# Patient Record
(demographics unavailable — no encounter records)

---

## 2017-01-01 NOTE — CONSULT
- Maternal History


Mother's Age: 38 years


 Status: 


Mother's Blood Type: A+


HBSAG: Negative


Date: 16


RPR: Negative


Date: 16


Group B Strep: Negative


HIV: Negative





- Maternal Risks


OB Risks: POSITIVE PPD (LD told in reprt that CXR negative).  SAB x2.  C/S 





 Data





- Admission


Date of Admission: 17


Admission Time: 22:52


Date of Delivery: 17


Time of Delivery: 22:35


Wks Gestation by Dates: 40.4


Infant Gender: Female


Type of Delivery: Repeat C/S


Reason for C Section: repeat


Apgar Score @1 Minute: 9


Apgar score @ 5 Minutes: 9


Birth Weight: 3.075 kg


Birth Length: 48.26 cm


Head Circumference, Admission: 34.5


Chest Circumference: 32


Abdominal Girth: 30.5





Level 2, History and Physical


[place] History: 


Called to repeat scheduled  at term. ROM at delivery, with clear 

fluid.  At birth bay cried.  Warmed, dried, suctioned and stimulated.  Apgars 9 

and 9.





-  Infant


Birth Weight: 3.075 kg


Birth Length: 48.26 cm


Vital Signs: 


 Vital Signs











Temperature  36.6 C   17 23:00


 


Pulse Rate  144   17 23:00


 


Respiratory Rate  62   17 23:00


 


Blood Pressure      


 


O2 Sat by Pulse Oximetry (%)      











Chest Circumference: 32


General Appearance: Yes: No Abnormalities


Skin: Yes: Other (Vey small/thin linear bruise on left face, extending into 

skull.)


Head: Yes: No Abnormalities


Eyes: Yes: No Abnormalities


Ears: Yes: No Abnormalities, Periauricular skin tag (right)


Nose: Yes: No Abnormalities


Mouth: Yes: No Abnormalities


Chest: Yes: No Abnormalities


Lungs/Respiratory: Yes: Clear


Cardiac: Yes: Other (RRR, No MRCG)


Abdomen: Yes: No Abnormalities


Gastrointestinal: Yes: No Abnormalities


Genitalia: No Abnormalities


Genitalia, Female: Yes: Labia Normal


Anus: Yes: Patent


Extremities: Yes: No Abnormalities


Femoral Pulse: Strong


Spine: Yes: No Abnormalities


Neuro: Yes: No Abnormalities


Cry: Yes: No Abnormalities





Assessment/Plan


Impression:


FT, well baby


recommendation:


routine  care

## 2017-01-01 NOTE — DS
- Maternal History


Mother's Age: 38 years


 Status: 


Mother's Blood Type: A+


HBSAG: Negative


Date: 16


RPR: Negative


Date: 16


Group B Strep: Negative


HIV: Negative





- Maternal Risks


OB Risks: POSITIVE PPD (LD told in reprt that CXR negative).  SAB x2.  C/S 





 Data





- Admission


Date of Admission: 17


Admission Time: 22:52


Date of Delivery: 17


Time of Delivery: 22:35


Wks Gestation by Dates: 40.4


Infant Gender: Female


Type of Delivery: Repeat C/S


Reason for C Section: repeat


Apgar Score @1 Minute: 9


Apgar score @ 5 Minutes: 9


Birth Weight: 6 lb 12.467 oz


Birth Length: 19 in


Head Circumference, Admission: 34.5


Chest Circumference: 32


Abdominal Girth: 30.5





- Vital Signs


  ** Right Calf


Blood Pressure: 66/52


Blood Pressure Mean: 56





  ** Left Calf


Blood Pressure: 77/47


Blood Pressure Mean: 57





  ** Right Lower Arm


Blood Pressure: 69/50


Blood Pressure Mean: 56





  ** Left Lower Arm


Blood Pressure: 65/51


Blood Pressure Mean: 55





- Hearing Screen


Left Ear: Passed


Right Ear: Passed


Hearing Screen Complete: 17





- Labs


Labs: 


 Transcutaneous Bilirubin











Transcutaneous Bilirubin       17





performed                      


 


Transcutaneous Bilirubin       17





performed                      


 


Transcutaneous Bilirubin       10.1





result                         


 


Transcutaneous Bilirubin       10.4





result                         











 Baby's Blood Type, Krista











Cord Blood Type  A POSITIVE   17  22:35    


 


LEONIDAS, Poly Interpret  Negative  (NEGATIVE)   17  22:35    














- [facility] Screening


[place] Screening Card Number: [id number]





[place] PE, Discharge





- Physical Exam


Last Weight Documented: 6 lb 4.884 oz


Vital Signs: 


 Vital Signs











Temperature  98.5 F   17 07:45


 


Pulse Rate  132   04/10/17 08:06


 


Respiratory Rate  62   17 23:00


 


Blood Pressure  66/52   17 08:06


 


O2 Sat by Pulse Oximetry (%)      








 SpO2





Preductal SpO2, Right Arm        98


Postductal SpO2 [Left Leg]       100








General Appearance: Yes: No Abnormalities


Skin: Yes: No Abnormalities, Hematoma (5 cm linear hematoma on left face), 

Jaundice (mild jaundice and resolving facial hematoma)


Head: Yes: No Abnormalities


Eyes: Yes: No Abnormalities


Ears: Yes: No Abnormalities


Nose: Yes: No Abnormalities


Mouth: Yes: No Abnormalities


Chest: Yes: No Abnormalities


Lungs/Respiratory: Yes: No Abnormalities


Cardiac: Yes: No Abnormalities


Abdomen: Yes: No Abnormalities


Gastrointestinal: Yes: No Abnormalities


Genitalia: No Abnormalities


Genitalia, Female: Yes: Other (vaginal skin tag)


Anus: Yes: No Abnormalities


Extremities: Yes: No Abnormalities


Spine: Yes: No Abnormalities


Reflexes: Adry: Present, Rooting: Present, Sucking: Present


Neuro: Yes: No Abnormalities


Cry: Yes: No Abnormalities


Preductal SpO2, Right Arm: 98


  ** Left Leg


Postductal SpO2: 100


Other Findings/Remarks: 


4 day FT female born by repeat c/s to 38  mom who was PPD+ but negative 

CXR.  Breastfeeding. Routine care. Follow up on discharge at [address], [phone] at 9:15 am on .  


Medications








Discontinued Medications





Hepatitis B Vaccine (Engerix-B 10 Mcg/0.5 Ml *Pediatric* -)  10 mcg IM .ONCE ONE


   Stop: 17 01:16


   Last Admin: 17 02:14 Dose:  10 mcg











Discharge Summary


Reason For Visit: 


Condition: Good





- Instructions


Referrals: 


Lux Bridges MD [Staff Physician] -  ([facility], [address] [place] [address], [address] on  at 9:15 am. [phone])


Disposition: HOME

## 2017-01-01 NOTE — HP
- Maternal History


Mother's Age: 38 years


 Status: 


Mother's Blood Type: A+


HBSAG: Negative


Date: 16


RPR: Negative


Date: 16


Group B Strep: Negative


HIV: Negative





- Maternal Risks


OB Risks: POSITIVE PPD (LD told in reprt that CXR negative).  SAB x2.  C/S 





 Data





- Admission


Date of Admission: 17


Admission Time: 22:52


Date of Delivery: 17


Time of Delivery: 22:35


Wks Gestation by Dates: 40.4


Infant Gender: Female


Type of Delivery: Repeat C/S


Reason for C Section: repeat


Apgar Score @1 Minute: 9


Apgar score @ 5 Minutes: 9


Birth Weight: 6 lb 12.467 oz


Birth Length: 19 in


Head Circumference, Admission: 34.5


Chest Circumference: 32


Abdominal Girth: 30.5





- Vital Signs


  ** Right Calf


Blood Pressure: 66/52


Blood Pressure Mean: 56





  ** Left Calf


Blood Pressure: 77/47


Blood Pressure Mean: 57





  ** Right Lower Arm


Blood Pressure: 69/50


Blood Pressure Mean: 56





  ** Left Lower Arm


Blood Pressure: 65/51


Blood Pressure Mean: 55





- [facility] Screening


 Screening Card Number: [id number]





[place] Infant, Physical Exam





- [place] Infant, Admission Exam


Birth Weight: 6 lb 12.467 oz


Birth Length: 19 in


Chest Circumference: 32


Initial Vital Signs: 


 Initial Vital Signs











Temp Pulse Resp


 


 98 F   144   62 


 


 17 23:00  17 23:00  17 23:00











General Appearance: Yes: No Abnormalities


Skin: Yes: No Abnormalities, Hematoma (5 cm linear hematoma on left face)


Head: Yes: No Abnormalities


Eyes: Yes: No Abnormalities


Ears: Yes: No Abnormalities


Nose: Yes: No Abnormalities


Mouth: Yes: No Abnormalities


Chest: Yes: No Abnormalities


Lungs/Respiratory: Yes: No Abnormalities


Cardiac: Yes: No Abnormalities


Abdomen: Yes: No Abnormalities


Gastrointestinal: Yes: No Abnormalities


Genitalia: No Abnormalities


Genitalia, Female: Yes: Other (vaginal skin tag)


Anus: Yes: No Abnormalities


Extremities: Yes: No Abnormalities


Clavicles: No abnormalities


Spine: Yes: No Abnormalities


Neuro: Yes: No Abnormalities





- Other Findings/Remarks


Other Findings/Remarks: 


1 day FT female born by repeat c/s to 38  mom who was PPD+ but negative 

CXR.  Breastfeeding. Routine care. Follow up on discharge at [facility], [address] [facility] [address] [facility], [phone].  


Medications








Discontinued Medications





Hepatitis B Vaccine (Engerix-B 10 Mcg/0.5 Ml *Pediatric* -)  10 mcg IM .ONCE ONE


   Stop: 17 01:16


   Last Admin: 17 02:14 Dose:  10 mcg

## 2017-01-01 NOTE — PN
[place], Progress Note





- [place] Exam


Weight: 6 lb 4.884 oz


Chest Circumference: 32


Head Circumference: 34.5


Vital Signs: 


 Vital Signs











Temperature  98.0 F   04/10/17 21:00


 


Pulse Rate  132   04/10/17 08:06


 


Respiratory Rate  62   17 23:00


 


Blood Pressure  66/52   17 08:06


 


O2 Sat by Pulse Oximetry (%)      











General Appearance: Yes: No Abnormalities


Skin: Yes: No Abnormalities, Hematoma (5 cm linear hematoma on left face)


Head: Yes: No Abnormalities


Eyes: Yes: No Abnormalities


Ears: Yes: No Abnormalities


Nose: Yes: No Abnormalities


Mouth: Yes: No Abnormalities


Chest: Yes: No Abnormalities


Lungs/Respiratory: Yes: No Abnormalities


Cardiac: Yes: No Abnormalities


Abdomen: Yes: No Abnormalities


Gastrointestinal: Yes: No Abnormalities


Genitalia: No Abnormalities


Genitalia, Female: Yes: Other (vaginal skin tag)


Anus: Yes: No Abnormalities


Extremities: Yes: No Abnormalities


Femoral Pulse: Strong


Spine: Yes: No Abnormalities


Neuro: Yes: No Abnormalities


Cry: No Abnormalities





- Other Data/Findings


Labs, Other Data: 


 Intake





Intake, Oral Amount              25


Intake, Oral Amount              15


Intake, Oral Amount              15





 Output





Number of Voids                  1


Number of Voids                  0


Number of Voids                  1


Number of Voids                  0


Number of Voids                  1


Number of Voids                  1


Stool Size                       Small


Stool Size                       Moderate


 Stool Description        Brown-Black,Soft


 Stool Description        Yellow,Pasty


 Stool Description        Meconium,Pasty





 Baby's Blood Type, Krista











Cord Blood Type  A POSITIVE   17  22:35    


 


LEONIDAS, Poly Interpret  Negative  (NEGATIVE)   17  22:35    











Other Findings/Remarks: 


3 day FT female born by repeat c/s to 38  mom who was PPD+ but negative 

CXR.  Breastfeeding. Routine care. Follow up on discharge at [facility] 

[facility], [address] [place] [address], [phone] at 9:15 am on .  


Medications








Discontinued Medications





Hepatitis B Vaccine (Engerix-B 10 Mcg/0.5 Ml *Pediatric* -)  10 mcg IM .ONCE ONE


   Stop: 17 01:16


   Last Admin: 17 02:14 Dose:  10 mcg

## 2017-01-01 NOTE — PN
[place], Progress Note





- [place] Exam


Weight: 6 lb 7.176 oz


Chest Circumference: 32


Head Circumference: 34.5


Vital Signs: 


 Vital Signs











Temperature  98.4 F   17 20:00


 


Pulse Rate  132   04/10/17 08:06


 


Respiratory Rate  62   17 23:00


 


Blood Pressure  66/52   17 08:06


 


O2 Sat by Pulse Oximetry (%)      











General Appearance: Yes: No Abnormalities


Skin: Yes: No Abnormalities, Hematoma (5 cm linear hematoma on left face)


Head: Yes: No Abnormalities


Eyes: Yes: No Abnormalities


Ears: Yes: No Abnormalities


Nose: Yes: No Abnormalities


Mouth: Yes: No Abnormalities


Chest: Yes: No Abnormalities


Lungs/Respiratory: Yes: No Abnormalities


Cardiac: Yes: No Abnormalities


Abdomen: Yes: No Abnormalities


Gastrointestinal: Yes: No Abnormalities


Genitalia: No Abnormalities


Genitalia, Female: Yes: Other (vaginal skin tag)


Anus: Yes: No Abnormalities


Extremities: Yes: No Abnormalities


Femoral Pulse: Strong


Spine: Yes: No Abnormalities


Neuro: Yes: No Abnormalities


Cry: No Abnormalities





- Other Data/Findings


Labs, Other Data: 


 Intake





Intake, Oral Amount              30





 Output





Number of Voids                  1


Number of Voids                  1


Stool Size                       Small


Stool Size                       Moderate


[place] Stool Description        Meconium,Pasty


 Stool Description        Meconium,Pasty


 Stool Description        Meconium,Pasty


[place] Stool Description        Meconium,Pasty


 Stool Description        Meconium,Pasty





 Baby's Blood Type, Krista











Cord Blood Type  A POSITIVE   17  22:35    


 


LEONIDAS, Poly Interpret  Negative  (NEGATIVE)   17  22:35    











Other Findings/Remarks: 


2 day FT female born by repeat c/s to 38  mom who was PPD+ but negative 

CXR.  Breastfeeding. Routine care. Follow up on discharge at [facility], [address] [place] [address], [phone] at 9:15 am.  


Medications








Discontinued Medications





Hepatitis B Vaccine (Engerix-B 10 Mcg/0.5 Ml *Pediatric* -)  10 mcg IM .ONCE ONE


   Stop: 17 01:16


   Last Admin: 17 02:14 Dose:  10 mcg